# Patient Record
(demographics unavailable — no encounter records)

---

## 2024-11-18 NOTE — PLAN
[FreeTextEntry1] : microhematuria check ua pt had urology w/u 2020  hyperlipidemia continue atorvastatin

## 2024-11-18 NOTE — HEALTH RISK ASSESSMENT
[Yes] : Yes [2 - 4 times a month (2 pts)] : 2-4 times a month (2 points) [1 or 2 (0 pts)] : 1 or 2 (0 points) [Little interest or pleasure doing things] : 1) Little interest or pleasure doing things [Feeling down, depressed, or hopeless] : 2) Feeling down, depressed, or hopeless [0] : 2) Feeling down, depressed, or hopeless: Not at all (0) [PHQ-2 Negative - No further assessment needed] : PHQ-2 Negative - No further assessment needed [Never] : Never [Patient reported mammogram was normal] : Patient reported mammogram was normal [Patient reported colonoscopy was normal] : Patient reported colonoscopy was normal [[id number]] : 0 [MammogramDate] : 11/23 [PapSmearComments] : sees yearly - appt 12/4/24 [ColonoscopyDate] : 03/24